# Patient Record
Sex: MALE
[De-identification: names, ages, dates, MRNs, and addresses within clinical notes are randomized per-mention and may not be internally consistent; named-entity substitution may affect disease eponyms.]

---

## 2019-11-25 ENCOUNTER — HOSPITAL ENCOUNTER (OUTPATIENT)
Dept: HOSPITAL 88 - OR | Age: 49
Discharge: HOME | End: 2019-11-25
Attending: INTERNAL MEDICINE
Payer: COMMERCIAL

## 2019-11-25 VITALS — SYSTOLIC BLOOD PRESSURE: 123 MMHG | DIASTOLIC BLOOD PRESSURE: 84 MMHG

## 2019-11-25 DIAGNOSIS — R03.0: ICD-10-CM

## 2019-11-25 DIAGNOSIS — Z01.810: ICD-10-CM

## 2019-11-25 DIAGNOSIS — K20.9: ICD-10-CM

## 2019-11-25 DIAGNOSIS — K21.9: ICD-10-CM

## 2019-11-25 DIAGNOSIS — R12: Primary | ICD-10-CM

## 2019-11-25 DIAGNOSIS — K29.00: ICD-10-CM

## 2019-11-25 DIAGNOSIS — B96.81: ICD-10-CM

## 2019-11-25 PROCEDURE — 43239 EGD BIOPSY SINGLE/MULTIPLE: CPT

## 2019-11-25 PROCEDURE — 93005 ELECTROCARDIOGRAM TRACING: CPT

## 2019-11-25 NOTE — OPERATIVE REPORT
DATE OF PROCEDURE:  11/25/2019

 

SURGEON:  Boni Sanchez MD

 

PROCEDURE:  EGD.

 

INDICATIONS FOR EGD:  Acid reflux.

 

MEDICATIONS:  The patient was done under MAC, please see anesthesiologist's note.

 

PROCEDURE IN DETAIL:  With the patient in left lateral decubitus position, the flexible

fiberoptic Olympus gastroscope was introduced into the esophagus under direct

visualization without any difficulty.  The mucosa overlying the distal esophagus

revealed diffuse erythema.  The scope was then advanced with ease into the stomach.

Mucosa overlying the antrum and the body revealed some diffuse intense erythema and

moderate edema and biopsies were obtained and sent to stain for H pylori.  The pylorus

was of normal contour and shape and was intubated with ease and the scope was advanced

all the way to the second portion of the duodenum.  The scope was then withdrawn slowly

and mucosa overlying the proximal second portion and the duodenal bulb appeared to be

within normal limits.  The scope was then withdrawn back into the stomach and

retroflexed and mucosa overlying the fundus and cardia appeared to be within normal

limits.  The scope was then straightened out.  The stomach was decompressed.  The scope

was subsequently withdrawn.  The patient tolerated the procedure well. 

 

IMPRESSION:  

1. Distal esophagitis.

2. Gastritis, biopsied.  Biopsies sent to stain for Helicobacter pylori.

 

PLAN:  Follow up histology.  Initiate Protonix 40 mg 1 p.o. q.a.m. before meals.

 

 

 

 

______________________________

Boni Sanchez MD

 

[facility]

D:  11/25/2019 15:46:35

T:  11/25/2019 22:24:42

Job #:  [id number]

 

cc:            Fausto Cabral DO

## 2020-04-24 ENCOUNTER — HOSPITAL ENCOUNTER (OUTPATIENT)
Dept: HOSPITAL 88 - CATH LAB | Age: 50
Setting detail: OBSERVATION
LOS: 1 days | Discharge: HOME | End: 2020-04-25
Attending: INTERNAL MEDICINE | Admitting: INTERNAL MEDICINE
Payer: COMMERCIAL

## 2020-04-24 VITALS — DIASTOLIC BLOOD PRESSURE: 92 MMHG | SYSTOLIC BLOOD PRESSURE: 139 MMHG

## 2020-04-24 VITALS — DIASTOLIC BLOOD PRESSURE: 63 MMHG | SYSTOLIC BLOOD PRESSURE: 116 MMHG

## 2020-04-24 VITALS — DIASTOLIC BLOOD PRESSURE: 66 MMHG | SYSTOLIC BLOOD PRESSURE: 116 MMHG

## 2020-04-24 VITALS — WEIGHT: 210 LBS | BODY MASS INDEX: 41.23 KG/M2 | HEIGHT: 60 IN

## 2020-04-24 VITALS — DIASTOLIC BLOOD PRESSURE: 87 MMHG | SYSTOLIC BLOOD PRESSURE: 145 MMHG

## 2020-04-24 VITALS — DIASTOLIC BLOOD PRESSURE: 80 MMHG | SYSTOLIC BLOOD PRESSURE: 121 MMHG

## 2020-04-24 VITALS — DIASTOLIC BLOOD PRESSURE: 63 MMHG | SYSTOLIC BLOOD PRESSURE: 122 MMHG

## 2020-04-24 VITALS — SYSTOLIC BLOOD PRESSURE: 145 MMHG | DIASTOLIC BLOOD PRESSURE: 87 MMHG

## 2020-04-24 VITALS — SYSTOLIC BLOOD PRESSURE: 127 MMHG | DIASTOLIC BLOOD PRESSURE: 63 MMHG

## 2020-04-24 VITALS — SYSTOLIC BLOOD PRESSURE: 116 MMHG | DIASTOLIC BLOOD PRESSURE: 67 MMHG

## 2020-04-24 DIAGNOSIS — Z01.812: ICD-10-CM

## 2020-04-24 DIAGNOSIS — I10: ICD-10-CM

## 2020-04-24 DIAGNOSIS — I25.110: Primary | ICD-10-CM

## 2020-04-24 DIAGNOSIS — I25.10: ICD-10-CM

## 2020-04-24 DIAGNOSIS — Z11.59: ICD-10-CM

## 2020-04-24 PROCEDURE — 99153 MOD SED SAME PHYS/QHP EA: CPT

## 2020-04-24 PROCEDURE — 92929: CPT

## 2020-04-24 PROCEDURE — 99152 MOD SED SAME PHYS/QHP 5/>YRS: CPT

## 2020-04-24 PROCEDURE — 93454 CORONARY ARTERY ANGIO S&I: CPT

## 2020-04-24 PROCEDURE — 92928 PRQ TCAT PLMT NTRAC ST 1 LES: CPT

## 2020-04-24 NOTE — NUR
1700p handoff completed No gross issues with pain,pallor,pressure or dysrhythmia.Dc pre- dc 
papers filled out.Transported via

stretcher with tele to room #298.Assisted to bed aware of importance of f/o up care and will 
have am Lab. Yari aware that wrist reminder removed with am shower and to savana Cabral 
office with report in am Left iv site remains intact. Normal neuro vascular function to rt 
arm.ds/rn

## 2020-04-24 NOTE — NUR
1315pm PREP NOTE PROCEDURAL 

pt in #9, prepped for procedure. Alert oriented and appropriate, PERRLA, respirations even 
and unlabored to room air. Pulses x4 extremities equal and faint. Pedal pulses PT/DP strong 
and marked. Cap fill brisk < 3 sec. bilateral feet warm and dry. Prepped for 

[facility] DR Cabral via Rt Radial approach.



Skin warm and dry integrity appears intact in general. IV left hand started by Kay GAMBOA 
,presents healthy w/o s/s of infiltration or complaint. Abdomen soft and supple. pt offered 
toileting,  urinated ,denies necessity to defecate. Personal affects with patient.  Family 
at bedside. Wife [phone].Pt and family verbalizes understanding of POC. bed low and 
locked, side rails up x2 and call light at side. Handoff to Kay urbano/rn

## 2020-04-24 NOTE — NUR
received report from day nurse. patient is resting comfortably in the bed. bed is in lowest 
position and call light is within reach. patient denies pain or discomfort. will continue to 
 monitor patient.

## 2020-04-24 NOTE — NUR
1630p RADIAL COMPRESSION REMOVAL NOTE:     Initial  Cuff  volume   12  cc



             1630p  -2cc Removed  No hematoma/bleeding noted with normal  neurovascular 
function.



             1645p -5cc Removed  No hematoma/ bleeding noted with normal  neurovascular 
function.



             1700p -5cc Removed  No hematoma/bleeding noted with normal  neurovascular 
function.



           



Air removal completed. 

Stasis achieved sterile 2x2,Tegaderm,  Coban  dressing  No hematoma, bleeding noted with 
normal neurovascular function. Wrist splint in place. Pt instructed on POC.

Ds/Rn

## 2020-04-24 NOTE — NUR
1523pm RECEIVING NOTE CATH LAB RECOVERY 
DEPT...............................................................





Bedside report received from BEE Villanueva. Identifierx2. Alert oriented and appropriate, 
PERRLA, respirations even and unlabored to room air. Pulses x4 extremities equal and strong. 
Pedal pulses PT/DP X4 and marked. Cap fill brisk < 3 sec. Rt TR bands site No gross

issues pain,pallor,pressure or dysrhythmia.



Skin warm and dry integrity appears D/I. IV 20g to left hand, presents healthy w/o s/s of 
infiltration or complaint. Abdomen soft and supple. pt offered toileting, denies need to 
urinate or defecate. No personal affects with patient.  Family wife at bedside.. Pt and 
family verbalizes understanding of POC.



Currently w/o complaint of pain or need. yolie/rn

## 2020-04-24 NOTE — NUR
patient is awake and resting in bed. patient states he does not take any prescription 
medications at home. will continue to monitor patient. bed is in lowest position and call 
light is within reach.

## 2020-04-25 VITALS — DIASTOLIC BLOOD PRESSURE: 91 MMHG | SYSTOLIC BLOOD PRESSURE: 128 MMHG

## 2020-04-25 VITALS — SYSTOLIC BLOOD PRESSURE: 116 MMHG | DIASTOLIC BLOOD PRESSURE: 80 MMHG

## 2020-04-25 VITALS — SYSTOLIC BLOOD PRESSURE: 149 MMHG | DIASTOLIC BLOOD PRESSURE: 89 MMHG

## 2020-04-25 VITALS — SYSTOLIC BLOOD PRESSURE: 128 MMHG | DIASTOLIC BLOOD PRESSURE: 91 MMHG

## 2020-04-25 NOTE — NUR
report given to morning nurse. bedside shift report complete. patient is resting in bed. bed 
is in lowest position and call light is within reach.

## 2020-04-25 NOTE — NUR
patient discharged home, Alert with no distress, NP of Dr Bender had rounds and given 
discharge order, right radial pressure dressing is intact, no bleeding, IV canula removed 
with tip intact, no ss of infiltration. denies any pain, his wife here to pick him,

## 2020-04-25 NOTE — PROGRESS NOTE
DATE:  

 

Cardiology Progress Note 

 

SUBJECTIVE:  The patient endorses some chest pressure yesterday, none this morning.

Denies any shortness of breath, dizziness, palpitation, or vertigo. 

 

OBJECTIVE:  VITAL SIGNS:  Temperature 98.2, pulse 71, respiratory rate 18, blood

pressure 128/91, oxygen saturation 99% on room air. 

GENERAL:  Alert and oriented x3.  Resting comfortably in the bed, does not appear to be

in any acute distress. 

NECK:  Supple.  No JVD noted. 

LUNGS:  Clear to auscultation throughout.  No wheezing, no crackles or rhonchi noted. 

CARDIOVASCULAR:  Regular rate and rhythm.  Normal S1, S2.  No murmurs, no gallops. 

ABDOMEN:  Soft, nontender. 

EXTREMITIES:  Lower extremity; no edema.  Right upper extremity access site with 2+

radial pulses.  Site covered with dressing. 

 

CARDIOVASCULAR MEDICATIONS:  These will be admitted today.

 

IMPRESSION:  

1. Coronary artery disease, status post right coronary artery percutaneous coronary

intervention yesterday.  Remnant 70% lesion noted in the left anterior descending. 

2. Hypertension.

 

RECOMMENDATIONS:  Continue oral medical therapy for management of the above.  Okay to

discharge this patient from a cardiovascular standpoint with outpatient followup and

plans for LAD PCI in the future.  We will continue to monitor this patient closely. 

 

 

Dictated by Leatha Baxter NP

 

______________________________

MD [facility]

D:  04/25/2020 09:07:56

T:  04/25/2020 10:22:42

Job #:  [id number]

## 2020-05-12 LAB
ALBUMIN SERPL-MCNC: 3.9 G/DL (ref 3.5–5)
ALBUMIN/GLOB SERPL: 1.1 {RATIO} (ref 0.8–2)
ALP SERPL-CCNC: 54 IU/L (ref 40–150)
ALT SERPL-CCNC: 44 IU/L (ref 0–55)
ANION GAP SERPL CALC-SCNC: 11.8 MMOL/L (ref 8–16)
BASOPHILS # BLD AUTO: 0 10*3/UL (ref 0–0.1)
BASOPHILS NFR BLD AUTO: 0.4 % (ref 0–1)
BUN SERPL-MCNC: 15 MG/DL (ref 7–26)
BUN/CREAT SERPL: 15 (ref 6–25)
CALCIUM SERPL-MCNC: 8.8 MG/DL (ref 8.4–10.2)
CHLORIDE SERPL-SCNC: 108 MMOL/L (ref 98–107)
CO2 SERPL-SCNC: 26 MMOL/L (ref 22–29)
DEPRECATED INR PLAS: 0.96
DEPRECATED NEUTROPHILS # BLD AUTO: 4 10*3/UL (ref 2.1–6.9)
EGFRCR SERPLBLD CKD-EPI 2021: > 60 ML/MIN (ref 60–?)
EOSINOPHIL # BLD AUTO: 0.6 10*3/UL (ref 0–0.4)
EOSINOPHIL NFR BLD AUTO: 7.8 % (ref 0–6)
ERYTHROCYTE [DISTWIDTH] IN CORD BLOOD: 12.7 % (ref 11.7–14.4)
GLOBULIN PLAS-MCNC: 3.5 G/DL (ref 2.3–3.5)
GLUCOSE SERPLBLD-MCNC: 98 MG/DL (ref 74–118)
HCT VFR BLD AUTO: 43.9 % (ref 38.2–49.6)
HGB BLD-MCNC: 14.1 G/DL (ref 14–18)
LYMPHOCYTES # BLD: 2.4 10*3/UL (ref 1–3.2)
LYMPHOCYTES NFR BLD AUTO: 30.1 % (ref 18–39.1)
MCH RBC QN AUTO: 27.5 PG (ref 28–32)
MCHC RBC AUTO-ENTMCNC: 32.1 G/DL (ref 31–35)
MCV RBC AUTO: 85.6 FL (ref 81–99)
MONOCYTES # BLD AUTO: 0.8 10*3/UL (ref 0.2–0.8)
MONOCYTES NFR BLD AUTO: 10.3 % (ref 4.4–11.3)
NEUTS SEG NFR BLD AUTO: 51.1 % (ref 38.7–80)
PLATELET # BLD AUTO: 279 X10E3/UL (ref 140–360)
POTASSIUM SERPL-SCNC: 3.8 MMOL/L (ref 3.5–5.1)
PROTHROMBIN TIME: 13.3 SECONDS (ref 11.9–14.5)
RBC # BLD AUTO: 5.13 X10E6/UL (ref 4.3–5.7)
SODIUM SERPL-SCNC: 142 MMOL/L (ref 136–145)

## 2020-05-15 ENCOUNTER — HOSPITAL ENCOUNTER (OUTPATIENT)
Dept: HOSPITAL 88 - LAB | Age: 50
Discharge: HOME | End: 2020-05-15
Attending: INTERNAL MEDICINE
Payer: COMMERCIAL

## 2020-05-15 VITALS — SYSTOLIC BLOOD PRESSURE: 120 MMHG | DIASTOLIC BLOOD PRESSURE: 80 MMHG

## 2020-05-15 VITALS — DIASTOLIC BLOOD PRESSURE: 82 MMHG | SYSTOLIC BLOOD PRESSURE: 123 MMHG

## 2020-05-15 VITALS — SYSTOLIC BLOOD PRESSURE: 118 MMHG | DIASTOLIC BLOOD PRESSURE: 80 MMHG

## 2020-05-15 VITALS — WEIGHT: 205 LBS | BODY MASS INDEX: 34.16 KG/M2 | HEIGHT: 65 IN

## 2020-05-15 VITALS — DIASTOLIC BLOOD PRESSURE: 82 MMHG | SYSTOLIC BLOOD PRESSURE: 131 MMHG

## 2020-05-15 VITALS — SYSTOLIC BLOOD PRESSURE: 120 MMHG | DIASTOLIC BLOOD PRESSURE: 83 MMHG

## 2020-05-15 VITALS — SYSTOLIC BLOOD PRESSURE: 123 MMHG | DIASTOLIC BLOOD PRESSURE: 79 MMHG

## 2020-05-15 DIAGNOSIS — Z11.59: ICD-10-CM

## 2020-05-15 DIAGNOSIS — I25.118: Primary | ICD-10-CM

## 2020-05-15 DIAGNOSIS — Z01.812: ICD-10-CM

## 2020-05-15 PROCEDURE — 85025 COMPLETE CBC W/AUTO DIFF WBC: CPT

## 2020-05-15 PROCEDURE — 80053 COMPREHEN METABOLIC PANEL: CPT

## 2020-05-15 PROCEDURE — 87635 SARS-COV-2 COVID-19 AMP PRB: CPT

## 2020-05-15 PROCEDURE — 36415 COLL VENOUS BLD VENIPUNCTURE: CPT

## 2020-05-15 PROCEDURE — 85610 PROTHROMBIN TIME: CPT

## 2020-05-15 NOTE — NUR
1545p RADIAL COMPRESSION REMOVAL NOTE:     Initial  Cuff  volume   12 cc



           1545p  -2cc Removed  No hematoma/bleeding noted with normal  neurovascular 
function.



           1600p -5cc Removed  No hematoma/ bleeding noted with normal  neurovascular 
function.



           1615p -5cc Removed  No hematoma/bleeding noted with normal  neurovascular 
function.



           



Air removal completed. 

Stasis achieved sterile 2x2,Tegaderm,  Coban  dressing  No hematoma, bleeding noted with 
normal neurovascular function. Wrist splint in place. Pt instructed on POC.

Ds/Rn

## 2020-05-15 NOTE — XMS REPORT
Clinical Summary

                             Created on: 05/15/2020



Soto Acevedo

External Reference #: [id number]

: 1970

Sex: Male



Demographics





                          Address                   [address]

[place]  [address]

 

                          Home Phone                [phone]

 

                          Preferred Language        English

 

                          Marital Status            Unknown

 

                          Gnosticism Affiliation     Unknown

 

                          Race                      Unknown

 

                          Ethnic Group              /Latin





Author





                          Author                    Kevon Protestant

 

                          Organization              [employer]

 

                          Address                   Unknown

 

                          Phone                     Unavailable







Support





                Name            Relationship    Address         Phone

 

                Contact No      [employer]            Unknown         [phone]







Care Team Providers





                    Care Team Member Name Role                Phone

 

                    Pino Cabral MD PCP                 [phone]







Allergies

Not on File



Medications

Not on file



Active Problems





Not on file



Social History





                                        Date



                 Tobacco Use     Types           Packs/Day       Years Used 

 

                                         



                                         Never Assessed    







 



                           Sex Assigned at Birth     Date Recorded

 

 



                                         Not on file 







                                        Industry



                           Job Start Date            Occupation 

 

                                        Not on file



                           Not on file               Not on file 







                                        Travel End



                           Travel History            Travel Start 

 





                                         No recent travel history available.







Last Filed Vital Signs

Not on file



Plan of Treatment





   



                 Health Maintenance  Due Date        Last Done       Comments

 

   



                           COLONOSCOPY SCREENING     10/25/2018  

 

   



                           SHINGLES VACCINES (#1)    10/25/2018  

 

   



                           INFLUENZA VACCINE         2020  







Results

Not on fileafter 05/15/2019



Insurance





                                        Type



            Payer      Benefit    Subscriber ID  Effective  Phone      Address 



                           Plan /                    Dates   



                                         Group     

 

                                        HMO



                 AETNA           AETNA           xxxxxxxxxx      2006-P   



                           HMO,POS,EP                resent   



                                         O, MC/[employer]     







     



            Guarantor Name  Account    Relation to  Date of    Phone      Billin

g Address



                     Type                Patient             Birth  

 

     



            Soto Acevedo  Personal/F  Self       1970  [phone]  [address]



                     amily               (Home)              [place], [place] [id number] [phone] 



                                         (Work) 







Advance Directives





For more information, please contact: [phone]







                          Patient Representative    Explanation



                           Type                      Date Recorded  

 

                                                     



                                         Advance Directives,   



                                         Living Will and   



                                         Medical Power of

## 2020-05-15 NOTE — NUR
1630pm CATH LAB RECOVERY  DISCHARGE NURSING 
NOTE--------------------------------------------------------



Pt meets DC criteria. Rt Tr band assessed for s/s of complication and presence of hematoma. 
Skin warm, dry, no discolor, and pulses present. IV removed from let ac. Distal tip appears 
intact. VS WNL. Pt denies pain, sob, or need at this time. Family at bs. Review of discharge 
paperwork and follow up instructions. verbalized understanding. Pt to wheelchair and 
transported to front of hospital. Transferred to private vehicle under own strength w/o 
incident with DC paperwork in hand. -Procedure not finished will rescheduled with Dr Cabral 
office. ds/rn

## 2020-05-15 NOTE — NUR
1515pm RECEIVING NOTE CATH LAB RECOVERY 
DEPT...............................................................





Bedside report received from BEE Evans. Identifierx2. Alert oriented and appropriate, 
PERRLA, respirations even and unlabored to room air. Pulses x4 extremities equal and strong. 
Pedal pulses PT/DP X4 and marked. Cap fill brisk < 3 sec. Rt TR band site w/o issues pain 
pallor pressure or dysthymia ,normal neurovascular function.Procedure held and to 
re-rescheduled Br Dr Cabral office.



Skin warm and dry integrity appears D/I. IV 20g to left ac no iv fluids infusing,  presents 
healthy w/o s/s of infiltration or complaint. Abdomen soft and supple. pt offered toileting, 
denies need to urinate or defecate. No personal affects with patient.  Family at bedside dc 
planning done.



Currently w/o complaint of pain or need. ds/rn



-

## 2020-05-21 ENCOUNTER — HOSPITAL ENCOUNTER (OUTPATIENT)
Dept: HOSPITAL 88 - CATH LAB | Age: 50
Discharge: HOME | End: 2020-05-21
Attending: INTERNAL MEDICINE
Payer: COMMERCIAL

## 2020-05-21 VITALS — WEIGHT: 205 LBS | BODY MASS INDEX: 34.16 KG/M2 | HEIGHT: 65 IN

## 2020-05-21 VITALS — SYSTOLIC BLOOD PRESSURE: 131 MMHG | DIASTOLIC BLOOD PRESSURE: 84 MMHG

## 2020-05-21 DIAGNOSIS — Z01.812: ICD-10-CM

## 2020-05-21 DIAGNOSIS — Z11.59: ICD-10-CM

## 2020-05-21 DIAGNOSIS — Z95.5: ICD-10-CM

## 2020-05-21 DIAGNOSIS — I10: ICD-10-CM

## 2020-05-21 DIAGNOSIS — I25.110: Primary | ICD-10-CM

## 2020-05-21 DIAGNOSIS — Z82.49: ICD-10-CM

## 2020-05-21 DIAGNOSIS — E78.5: ICD-10-CM

## 2020-05-21 PROCEDURE — 93458 L HRT ARTERY/VENTRICLE ANGIO: CPT

## 2020-05-21 PROCEDURE — 87635 SARS-COV-2 COVID-19 AMP PRB: CPT

## 2020-05-21 PROCEDURE — 92928 PRQ TCAT PLMT NTRAC ST 1 LES: CPT

## 2020-05-21 NOTE — OPERATIVE REPORT
DATE OF PROCEDURE:  

 

SURGEON:  Stanton Cabral DO

 

PROCEDURES PERFORMED:  

1. Conscious sedation, 45 minutes.

2. Selective coronary angiography x2.

3. Left heart catheterization.

4. Percutaneous coronary intervention of the left anterior descending coronary artery.

 

PREOPERATIVE DIAGNOSIS:  Unstable angina with known coronary artery disease.

 

POSTPROCEDURE DIAGNOSIS:  Coronary artery disease.

 

ESTIMATED BLOOD LOSS:  Less than 20 mL.

 

SPECIMENS REMOVED:  None.

 

PROCEDURE IN DETAIL:  After informed consent was obtained, the patient was brought to

the cardiac catheterization laboratory in a fasting and nonsedated state.  Bilateral

groins were prepped and draped in usual sterile fashion.  Right wrist was prepped and

draped in usual sterile fashion.  The patient received fentanyl and midazolam and was

administered by the cath lab nurse.  His physiologic status and conscious state were

monitored throughout the procedure by myself and the cath lab nurse for 45 minutes.  A

2% lidocaine was after the right wrist for local anesthesia.  Using micropuncture

needle, the right radial artery was accessed via modified Seldinger technique and a

6-Turkish slender sheath was placed.  Next diagnostic coronary angiography was performed

using a JR4 and an XB3 guide.  The LAD had a known 70% lesion and did not dilate with

intracoronary nitroglycerin.  The patient received additional heparin for therapeutic

anticoagulation.  The lesion was crossed with a run-through wire, pre-dilated with a 2.5

balloon.  The lesion was stented with a 2.5 x 16 Synergy drug-eluting stent.  Next, the

stent was postdilated with a 2.75 x 12 noncompliant balloon.  Final angiography revealed

excellent stent apposition with brisk JEAN-3 flow distally.  There is no evidence of

edge dissection, distal embolization, vessel perforation at the end of the case.  Left

heart catheterization and right coronary angiography were performed.  Subsequently.  The

patient tolerated the procedure well with no immediate complications and was transferred

back to his room in stable condition. 

 

PROCEDURAL FINDINGS:  

1. Left main coronary artery is patent without significant disease.

2. The left anterior descending coronary artery is a mid 70% stenosis and a distal 50%

stenosis. 

3. Left circumflex coronary provides two obtuse marginal vessels luminal irregularities.

4. Right coronary artery is a large dominant vessel.  The distal RCA stent extending

into the PDA across the posterior lateral branch is patent with JEAN-3 flow in all

branches. 

5. Left ventricular end-diastolic pressure was 11 mmHg.  No aortic valve gradient

present upon pullback. 

 

INTERVENTION RESULTS:  Successful percutaneous coronary intervention of the left

anterior descending coronary artery.  Pre-PCI JEAN flow was 2.  Post PCI JEAN flow was

3.  Postprocedure stenosis less than 10%. 

 

IMPRESSION:  Coronary artery disease with unstable angina.

 

RECOMMENDATIONS:  Continue dual antiplatelet therapy.

 

 

 

 

______________________________

DO VAN Vasques/[facility]

D:  05/21/2020 11:43:17

T:  05/21/2020 15:59:49

Job #:  [id number]

## 2020-06-17 NOTE — OPERATIVE REPORT
DATE OF PROCEDURE:  

 

SURGEON:  Stanton Cabral DO

 

PROCEDURES PERFORMED:  

1. Conscious sedation 45 minutes.

2. Selective coronary angiography x2.

3. Percutaneous coronary intervention of the right coronary artery.

 

PRE-PROCEDURE DIAGNOSIS:  Inferior ischemia.

 

POSTPROCEDURE DIAGNOSES:  

1. Inferior ischemia.

2. Coronary artery disease.

 

ESTIMATED BLOOD LOSS:  Less than 20 mL.

 

SPECIMENS REMOVED:  None.

 

PROCEDURE IN DETAIL:  After informed consent was obtained, the patient was brought to

the cardiac catheterization laboratory in a fasting and nonsedated state.  Bilateral

groins were prepped and draped in the usual sterile fashion.  His right wrist was

prepped and draped in the usual fashion.  The patient received fentanyl and midazolam

administered by myself and the cath lab staff and we monitored his physiologic and

neurologic status for 45 minutes.  Again, access into the right radial artery performed

bilateral coronary angiography which confirmed significant disease in the distal right

coronary artery and the PDA.  Decision was made to intervene.  The right coronary artery

was cannulated with a 6-Burmese Ikari right 1.0 guide.  The patient received systemic

heparin for therapeutic anticoagulation.  The lesion was crossed with a whisper wire.

Distal injection confirmed intraluminal status and performed balloon angioplasty.  Next,

the PDA was stented with a 3 x 24 Synergy drug-eluting stent.  Next in an overlapping

fashion, the ostium of the PDA extending back into the distal RCA crossing the posterior

lateral branch was stented with a 3.5 x 28 Synergy drug-eluting stent.  The stents were

then post dilated with a 4 mm noncompliant balloon.  Final angiography confirmed

excellent results with brisk JEAN flow distally. 

 

PROCEDURAL FINDINGS:  

1. Left main coronary artery is patent.

2. Left anterior descending coronary has two mid tandem 70% lesions.

3. Left circumflex coronary provides two obtuse marginal vessels with no significant

disease. 

4. Right coronary is a large dominant vessel.  This provides the PDA which is 100%

occluded and the distal RCA has a 70% lesion.  There is a large posterolateral branch. 

 

INTERVENTIONAL RESULTS:  Successful percutaneous intervention of the right coronary

artery.  Pre-PCI JEAN flow was zero.  Post PCI JEAN flow was 3.  Postprocedure stenosis

less than 10%. 

 

IMPRESSION:  Abnormal stress test with coronary artery disease, status post coronary

intervention of the right coronary artery. 

 

RECOMMENDATIONS:  Continue dual antiplatelet therapy.

 

 

 

 

______________________________

DO VAN Vasques/[facility]

D:  06/17/2020 09:46:51

T:  06/17/2020 13:28:45

Job #:  [id number]

## 2021-08-30 ENCOUNTER — HOSPITAL ENCOUNTER (OUTPATIENT)
Dept: HOSPITAL 88 - CT | Age: 51
End: 2021-08-30
Attending: INTERNAL MEDICINE
Payer: COMMERCIAL

## 2021-08-30 DIAGNOSIS — R10.84: Primary | ICD-10-CM

## 2021-08-30 PROCEDURE — 74177 CT ABD & PELVIS W/CONTRAST: CPT

## 2021-09-07 ENCOUNTER — HOSPITAL ENCOUNTER (EMERGENCY)
Dept: HOSPITAL 88 - ER | Age: 51
Discharge: HOME | End: 2021-09-07
Payer: COMMERCIAL

## 2021-09-07 VITALS — HEIGHT: 69 IN | BODY MASS INDEX: 29.62 KG/M2 | WEIGHT: 200 LBS

## 2021-09-07 DIAGNOSIS — J40: ICD-10-CM

## 2021-09-07 DIAGNOSIS — U07.1: Primary | ICD-10-CM

## 2021-09-07 PROCEDURE — 99284 EMERGENCY DEPT VISIT MOD MDM: CPT

## 2021-09-07 PROCEDURE — 71045 X-RAY EXAM CHEST 1 VIEW: CPT
